# Patient Record
(demographics unavailable — no encounter records)

---

## 2018-06-26 NOTE — EMERGENCY DEPARTMENT REPORT
ED Abdominal Pain HPI





- General


Chief Complaint: Abdominal Pain


Stated Complaint: COMPLICATIONS POST SURGERY


Time Seen by Provider: 18 18:46


Source: patient


Mode of arrival: Ambulatory


Limitations: No Limitations





- History of Present Illness


Initial Comments: 





45 year-old who presents with abdominal pain. Seen in surgery clinic today and 

sent for CT. had ventral hernia repair early april complicated by what soudns 

like seroma formation. Now with midline swelling again. Sent from clinic for 

CT. endorses NV. No true fever, Tmax 100.1. 


MD Complaint: abdominal pain


-: Gradual


Location: periumbilical


Radiation: none


Migration to: no migration


Severity: severe


Quality: stabbing, sharp


Consistency: constant


Improves With: nothing


Worsens With: nothing


Context: recent surgery/procedure


Associated Symptoms: nausea, vomiting





- Related Data


LMP (females 10-50): this week


 Home Medications











 Medication  Instructions  Recorded  Confirmed  Last Taken


 


No Known Home Medications [No  18 Unknown





Reported Home Medications]    











 Allergies











Allergy/AdvReac Type Severity Reaction Status Date / Time


 


methylprednisolone Allergy  Hives Verified 18 11:45





[From Solu-Medrol]     


 


moxifloxacin [From Avelox] Allergy  Hives Verified 18 11:45














ED Review of Systems


ROS: 


Stated complaint: COMPLICATIONS POST SURGERY


Other details as noted in HPI





Comment: All other systems reviewed and negative





ED Past Medical Hx





- Past Medical History


Previous Medical History?: Yes


Hx COPD: Yes





- Surgical History


Past Surgical History?: Yes


Hx Cholecystectomy: Yes


Additional Surgical History: hernia repair .  





- Social History


Smoking Status: Never Smoker


Substance Use Type: Alcohol





- Medications


Home Medications: 


 Home Medications











 Medication  Instructions  Recorded  Confirmed  Last Taken  Type


 


No Known Home Medications [No  18 Unknown History





Reported Home Medications]     














ED Physical Exam





- General


Limitations: No Limitations


General appearance: alert, in no apparent distress





- Head


Head exam: Present: atraumatic, normocephalic





- Eye


Eye exam: Present: normal appearance





- ENT


ENT exam: Present: normal exam, mucous membranes moist





- Neck


Neck exam: Present: normal inspection





- Respiratory


Respiratory exam: Present: normal lung sounds bilaterally.  Absent: respiratory 

distress





- Cardiovascular


Cardiovascular Exam: Present: regular rate, normal rhythm.  Absent: systolic 

murmur, diastolic murmur, rubs, gallop





- GI/Abdominal


GI/Abdominal exam: Present: soft, other (Midline lower abdominal incision with 

palpable tender masses, 2-3 cm. multiple).  Absent: distended, guarding, rebound





- Extremities Exam


Extremities exam: Present: normal inspection





- Back Exam


Back exam: Present: normal inspection





- Neurological Exam


Neurological exam: Present: alert, oriented X3





- Psychiatric


Psychiatric exam: Present: normal affect, normal mood





- Skin


Skin exam: Present: warm, dry, intact, normal color.  Absent: rash





ED Course


 Vital Signs











  18





  11:45 20:12


 


Temperature 99.2 F 99.2 F


 


Pulse Rate 96 H 90


 


Respiratory 20 18





Rate  


 


Blood Pressure 146/77 


 


Blood Pressure  135/60





[Left]  


 


O2 Sat by Pulse 98 98





Oximetry  














ED Medical Decision Making





- Lab Data


Result diagrams: 


 18 12:02





 18 12:02








 Lab Results











  18 Range/Units





  12:02 12:02 12:02 


 


WBC  6.5    (4.5-11.0)  K/mm3


 


RBC  5.12 H    (3.65-5.03)  M/mm3


 


Hgb  10.6    (10.1-14.3)  gm/dl


 


Hct  34.6    (30.3-42.9)  %


 


MCV  68 L    (79-97)  fl


 


MCH  21 L    (28-32)  pg


 


MCHC  31    (30-34)  %


 


RDW  17.1 H    (13.2-15.2)  %


 


Plt Count  164    (140-440)  K/mm3


 


Lymph % (Auto)  21.2    (13.4-35.0)  %


 


Mono % (Auto)  6.5    (0.0-7.3)  %


 


Eos % (Auto)  15.1 H    (0.0-4.3)  %


 


Baso % (Auto)  0.6    (0.0-1.8)  %


 


Lymph #  1.4    (1.2-5.4)  K/mm3


 


Mono #  0.4    (0.0-0.8)  K/mm3


 


Eos #  1.0 H    (0.0-0.4)  K/mm3


 


Baso #  0.0    (0.0-0.1)  K/mm3


 


Seg Neutrophils %  56.6    (40.0-70.0)  %


 


Seg Neutrophils #  3.7    (1.8-7.7)  K/mm3


 


PT   12.9   (12.2-14.9)  Sec.


 


INR   0.93   (0.87-1.13)  


 


VBG pH     (7.320-7.420)  


 


Sodium    137  (137-145)  mmol/L


 


Potassium    4.1  (3.6-5.0)  mmol/L


 


Chloride    96.7 L  ()  mmol/L


 


Carbon Dioxide    27  (22-30)  mmol/L


 


Anion Gap    17  mmol/L


 


BUN    10  (7-17)  mg/dL


 


Creatinine    0.7  (0.7-1.2)  mg/dL


 


Estimated GFR    > 60  ml/min


 


BUN/Creatinine Ratio    14  %


 


Glucose    139 H  ()  mg/dL


 


Lactic Acid     (0.7-2.0)  mmol/L


 


Calcium    9.0  (8.4-10.2)  mg/dL


 


Total Bilirubin    0.60  (0.1-1.2)  mg/dL


 


AST    21  (5-40)  units/L


 


ALT    23  (7-56)  units/L


 


Alkaline Phosphatase    49  ()  units/L


 


Total Protein    7.2  (6.3-8.2)  g/dL


 


Albumin    3.6 L  (3.9-5)  g/dL


 


Albumin/Globulin Ratio    1.0  %


 


HCG, Qual     (Negative)  


 


Urine Color     (Yellow)  


 


Urine Turbidity     (Clear)  


 


Urine pH     (5.0-7.0)  


 


Ur Specific Gravity     (1.003-1.030)  


 


Urine Protein     (Negative)  mg/dL


 


Urine Glucose (UA)     (Negative)  mg/dL


 


Urine Ketones     (Negative)  mg/dL


 


Urine Blood     (Negative)  


 


Urine Nitrite     (Negative)  


 


Ur Reducing Substances     


 


Urine Bilirubin     (Negative)  


 


Urine Ictotest     


 


Urine Urobilinogen     (<2.0)  mg/dL


 


Ur Leukocyte Esterase     (Negative)  


 


Urine WBC (Auto)     (0.0-6.0)  /HPF


 


Urine RBC (Auto)     (0.0-6.0)  /HPF


 


U Epithel Cells (Auto)     (0-13.0)  /HPF


 


Urine Mucus     /HPF


 


Urine Yeast (Budding)     /HPF


 


Urine Sperm     (NP)  /HPF














  18 Range/Units





  12:02 12:02 19:22 


 


WBC     (4.5-11.0)  K/mm3


 


RBC     (3.65-5.03)  M/mm3


 


Hgb     (10.1-14.3)  gm/dl


 


Hct     (30.3-42.9)  %


 


MCV     (79-97)  fl


 


MCH     (28-32)  pg


 


MCHC     (30-34)  %


 


RDW     (13.2-15.2)  %


 


Plt Count     (140-440)  K/mm3


 


Lymph % (Auto)     (13.4-35.0)  %


 


Mono % (Auto)     (0.0-7.3)  %


 


Eos % (Auto)     (0.0-4.3)  %


 


Baso % (Auto)     (0.0-1.8)  %


 


Lymph #     (1.2-5.4)  K/mm3


 


Mono #     (0.0-0.8)  K/mm3


 


Eos #     (0.0-0.4)  K/mm3


 


Baso #     (0.0-0.1)  K/mm3


 


Seg Neutrophils %     (40.0-70.0)  %


 


Seg Neutrophils #     (1.8-7.7)  K/mm3


 


PT     (12.2-14.9)  Sec.


 


INR     (0.87-1.13)  


 


VBG pH   7.351   (7.320-7.420)  


 


Sodium     (137-145)  mmol/L


 


Potassium     (3.6-5.0)  mmol/L


 


Chloride     ()  mmol/L


 


Carbon Dioxide     (22-30)  mmol/L


 


Anion Gap     mmol/L


 


BUN     (7-17)  mg/dL


 


Creatinine     (0.7-1.2)  mg/dL


 


Estimated GFR     ml/min


 


BUN/Creatinine Ratio     %


 


Glucose     ()  mg/dL


 


Lactic Acid  1.20    (0.7-2.0)  mmol/L


 


Calcium     (8.4-10.2)  mg/dL


 


Total Bilirubin     (0.1-1.2)  mg/dL


 


AST     (5-40)  units/L


 


ALT     (7-56)  units/L


 


Alkaline Phosphatase     ()  units/L


 


Total Protein     (6.3-8.2)  g/dL


 


Albumin     (3.9-5)  g/dL


 


Albumin/Globulin Ratio     %


 


HCG, Qual    Negative  (Negative)  


 


Urine Color     (Yellow)  


 


Urine Turbidity     (Clear)  


 


Urine pH     (5.0-7.0)  


 


Ur Specific Gravity     (1.003-1.030)  


 


Urine Protein     (Negative)  mg/dL


 


Urine Glucose (UA)     (Negative)  mg/dL


 


Urine Ketones     (Negative)  mg/dL


 


Urine Blood     (Negative)  


 


Urine Nitrite     (Negative)  


 


Ur Reducing Substances     


 


Urine Bilirubin     (Negative)  


 


Urine Ictotest     


 


Urine Urobilinogen     (<2.0)  mg/dL


 


Ur Leukocyte Esterase     (Negative)  


 


Urine WBC (Auto)     (0.0-6.0)  /HPF


 


Urine RBC (Auto)     (0.0-6.0)  /HPF


 


U Epithel Cells (Auto)     (0-13.0)  /HPF


 


Urine Mucus     /HPF


 


Urine Yeast (Budding)     /HPF


 


Urine Sperm     (NP)  /HPF














  18 Range/Units





  19:36 


 


WBC   (4.5-11.0)  K/mm3


 


RBC   (3.65-5.03)  M/mm3


 


Hgb   (10.1-14.3)  gm/dl


 


Hct   (30.3-42.9)  %


 


MCV   (79-97)  fl


 


MCH   (28-32)  pg


 


MCHC   (30-34)  %


 


RDW   (13.2-15.2)  %


 


Plt Count   (140-440)  K/mm3


 


Lymph % (Auto)   (13.4-35.0)  %


 


Mono % (Auto)   (0.0-7.3)  %


 


Eos % (Auto)   (0.0-4.3)  %


 


Baso % (Auto)   (0.0-1.8)  %


 


Lymph #   (1.2-5.4)  K/mm3


 


Mono #   (0.0-0.8)  K/mm3


 


Eos #   (0.0-0.4)  K/mm3


 


Baso #   (0.0-0.1)  K/mm3


 


Seg Neutrophils %   (40.0-70.0)  %


 


Seg Neutrophils #   (1.8-7.7)  K/mm3


 


PT   (12.2-14.9)  Sec.


 


INR   (0.87-1.13)  


 


VBG pH   (7.320-7.420)  


 


Sodium   (137-145)  mmol/L


 


Potassium   (3.6-5.0)  mmol/L


 


Chloride   ()  mmol/L


 


Carbon Dioxide   (22-30)  mmol/L


 


Anion Gap   mmol/L


 


BUN   (7-17)  mg/dL


 


Creatinine   (0.7-1.2)  mg/dL


 


Estimated GFR   ml/min


 


BUN/Creatinine Ratio   %


 


Glucose   ()  mg/dL


 


Lactic Acid   (0.7-2.0)  mmol/L


 


Calcium   (8.4-10.2)  mg/dL


 


Total Bilirubin   (0.1-1.2)  mg/dL


 


AST   (5-40)  units/L


 


ALT   (7-56)  units/L


 


Alkaline Phosphatase   ()  units/L


 


Total Protein   (6.3-8.2)  g/dL


 


Albumin   (3.9-5)  g/dL


 


Albumin/Globulin Ratio   %


 


HCG, Qual   (Negative)  


 


Urine Color  Yellow  (Yellow)  


 


Urine Turbidity  Slightly cloudy  (Clear)  


 


Urine pH  6.0  (5.0-7.0)  


 


Ur Specific Gravity  1.024  (1.003-1.030)  


 


Urine Protein  100 mg/dl  (Negative)  mg/dL


 


Urine Glucose (UA)  Neg  (Negative)  mg/dL


 


Urine Ketones  Neg  (Negative)  mg/dL


 


Urine Blood  Lg  (Negative)  


 


Urine Nitrite  Neg  (Negative)  


 


Ur Reducing Substances  Not Reportable  


 


Urine Bilirubin  Neg  (Negative)  


 


Urine Ictotest  Not Reportable  


 


Urine Urobilinogen  2.0  (<2.0)  mg/dL


 


Ur Leukocyte Esterase  Sm  (Negative)  


 


Urine WBC (Auto)  > 182.0 H  (0.0-6.0)  /HPF


 


Urine RBC (Auto)  > 182.0  (0.0-6.0)  /HPF


 


U Epithel Cells (Auto)  11.0  (0-13.0)  /HPF


 


Urine Mucus  3+  /HPF


 


Urine Yeast (Budding)  3+  /HPF


 


Urine Sperm  1+  (NP)  /HPF














- Radiology Data


Radiology results: report reviewed





- Medical Decision Making





Ms Carlos is a 45 year-old woman with hx of COPD and ventral hernia repair in 

April who presents from general surgery office for evaluation and CT scan. Had 

seroma drained previously. Has tender swelling in her incision site. VSS. 

Afebrile here. Labs wnl. Exam with non-reducible masses within her incision. 

Suspect this is seroma vs hernia vs abscess. no evidence of infection on exam 

or labs. CT reveals small fat containing hernias. Unable to reduce. Spoke with 

Dr. Galvez who would like admission to hospitalist. Would like IR consult for 

drainage. Dr Galvez will see patient in the morning. Per Dr Galvez, patient is 

NOT to receive narcotics. Received one dose fentanyl on arrival. Given toradol 

as well. Admit to hospital medicine. 


Critical care attestation.: 


If time is entered above; I have spent that time in minutes in the direct care 

of this critically ill patient, excluding procedure time.








ED Disposition


Clinical Impression: 


 Ventral hernia, recurrent





Disposition: DC-09 OP ADMIT IP TO THIS HOSP


Is pt being admited?: Yes


Does the pt Need Aspirin: No


Condition: Stable


Instructions:  Abdominal Pain (ED)


Referrals: 


PRIMARY CARE,MD [Primary Care Provider] - 3-5 Days

## 2018-06-26 NOTE — CAT SCAN REPORT
FINAL REPORT



PROCEDURE:  CT ABDOMEN PELVIS WO CON



TECHNIQUE:  Computerized axial tomography of the abdomen and

pelvis was performed without intravenous contrast. This study is

performed without intravascular contrast material and its

sensitivity for abdominal and pelvic pathology, including

neoplasms, inflammation, abscess, free fluid, thrombosis,

arterial dissection and infarction, is reduced compared with a

contrast enhanced study. 



HISTORY:  abdominal pain 



COMPARISON:  No prior studies are available for comparison.



FINDINGS:  

Lower Lung fields: No focal abnormality seen.



Upper Abdomen: Large hiatal hernia is visualized. Gallbladder is

surgically absent. Unenhanced images of the liver are

unremarkable. The adrenal glands, pancreas and spleen are

unremarkable.



Kidneys, Ureters and Urinary bladder: There is a coarse

benign-appearing calcification in the middle 3rd of the right

kidney measuring 1.5 x 0.7 centimeters. Second smaller

calcification also visualized measuring approximately 4

millimeters in size. No renal masses are identified. There is no

hydronephrosis. No ureteral calculi are visualized. The ureters

are not distended. Urinary bladder is nearly empty and difficult

to characterize.



Retroperitoneum: Atherosclerotic changes are seen in the

abdominal aorta and iliac arteries. No aneurysm is visualized.



Nonspecific subcentimeter lymph nodes are seen in the

retroperitoneum. No pathologically enlarged lymph nodes are

identified. 



Bowel: The rectum is moderately distended with stool otherwise is

unremarkable. Minimal diverticulosis visualized in the splenic

flexure and mid transverse colon. The appendix is not visualized.

No evidence of bowel obstruction. There is no ascites or free

intraperitoneal gas.



There is a anterior pelvic wall hernia just to the left of

midline. This contains a moderate amount of fluid. There is also

adipose tissue present. This also shows irregular soft tissue

density anteriorly seen on image 139 series 2. This measures up

to 1.4 centimeters in thickness. This could represent a seroma or

hematoma within a hernia. Wall thickening anteriorly may

represent inflammatory response. I cannot exclude a mass in the

anterior wall of the hernia. 



Reproductive organs: Uterus and adnexa are unremarkable.



Other: No acute bony abnormalities are visualized.



IMPRESSION:  

Abnormal fluid collections seen adipose tissue anterior to the

lower pelvis. I suspect this represents a hernia containing

moderate amount of fluid and adipose tissue. As indicated above

there appears to be soft tissue thickening of the anterior wall

the hernia sac. This may represent an inflammatory response. I

cannot exclude a mass.



There is no evidence of bowel obstruction. No ascites is seen in

the peritoneal cavity.



Prior cholecystectomy.



Benign-appearing moderate-sized calculus right kidney.



The rectum is moderately distended with stool otherwise is

unremarkable.



Minimal colonic diverticulosis as described.



Large hiatal hernia is visualized..

## 2018-06-26 NOTE — XRAY REPORT
AP CHEST:



HISTORY: Sepsis



AP view of the chest demonstrates a normal mediastinal and

cardiac contour with clear lungs and normal bony and soft tissue

structures.



IMPRESSION:

Unremarkable AP chest.

## 2018-06-27 NOTE — CONSULTATION
History of Present Illness


Consult date: 18


Chief complaint: 





abd pain





- History of present illness


History of present illness: 





46 yo F with hx of ventral hernia and multiple abdominal operations now s/p 

robotic assisted laparoscopic lysis of adhesions, ventral hernia repair with 

mesh in 2018 by Dr. Sexton. The patient has been back and forth to the 

[facility] ER near her home with abdominal pain after the surgery. She was 

readmitted to [facility] one month ago due to abdominal pain, subcutaneous fluid 

collections which were drained and found to be post op seromas. She felt better 

after drainage and was discharged. Since then, she states the pain has 

restarted and she again went to [facility] multiple times for pain medication. 

Despite telling the patient multiple times to follow up in my office, she did 

not until yesterday. The patient was sent to ER for fever w/u as temp in office 

was 100.1. It was also felt that she likely had re accumulated seroma which was 

leading to her pain. She was seen by IR and a CT guided drainage catheter was 

placed. The patient states she feels much better after the drainage procedure 

and her pain is minimal. She is tolerating a diet. She has been afebrile. 





Past History


Past Medical History: other (asthma, chronic pain, ventral hernia, anxiety, 

obesity, hx of suicidal ideations)


Past Surgical History: , hysterectomy, hernia repair, Other (ventral 

hernia repair with mesh multiple)


Social history: no significant social history, alcohol abuse (social).  denies: 

smoking


Family history: no significant family history





Medications and Allergies


 Allergies











Allergy/AdvReac Type Severity Reaction Status Date / Time


 


methylprednisolone Allergy  Hives Verified 18 11:45





[From Solu-Medrol]     


 


moxifloxacin [From Avelox] Allergy  Hives Verified 18 11:45











 Home Medications











 Medication  Instructions  Recorded  Confirmed  Last Taken  Type


 


No Known Home Medications [No  18 Unknown History





Reported Home Medications]     











Active Meds: 


Active Medications





Acetaminophen (Tylenol)  650 mg PO Q4H PRN


   PRN Reason: Fever >101


   Last Admin: 18 07:54 Dose:  650 mg


Acetaminophen/Hydrocodone Bitart (Norco 5/325)  1 each PO Q6H PRN


   PRN Reason: Pain, Moderate (4-6)


   Last Admin: 18 17:25 Dose:  1 each


Heparin Sodium (Porcine) (Heparin)  5,000 unit SUB-Q Q12HR [facility]


   Last Admin: 18 11:22 Dose:  Not Given


Ceftriaxone Sodium 1 gm/ (Sodium Chloride)  20 mls @ 2 mls/min IV Q24HR [facility]


   Last Admin: 18 12:05 Dose:  2 mls/min


Ondansetron HCl (Zofran)  4 mg IV Q8H PRN


   PRN Reason: Nausea And Vomiting











Review of Systems


All systems: negative (10 point ROS performed and negative except for that 

listed in HPI)





Exam


 Vital Signs











Temp Pulse Resp BP Pulse Ox


 


 99.2 F   96 H  20   146/77   98 


 


 18 11:45  18 11:45  18 11:45  18 11:45  18 11:45











Narrative exam: 





Gen: AAOx3. NAD


CV: S1, S2+


resp: even and unlabored


Abd: soft, NT, ND. KARINA drain with serosang drainage. Dressing c/d/i


Ext: no c/c/e





Results





- Labs





 18 12:02





 18 12:02


 Abnormal lab results











  18 Range/Units





  19:36 


 


Urine WBC (Auto)  > 182.0 H  (0.0-6.0)  /HPF














- Imaging


CT scan - abdomen: report reviewed, image reviewed


CT scan - pelvis: report reviewed, image reviewed





Assessment and Plan





46 yo F with post operative recurrent seroma s/p robotic assisted laparoscopic 

lysis of adhesions, ventral hernia repair with mesh (2018)





s/p CT guided drain placement by IR





Plan:





1. Reg diet


2. PRN PO pain control, no IV pain meds please


3. KARINA drain management


4. cultures pending but likely sterile collection


5. OOB/ambulate


6. OK to dc home from surgery standpoint with follow up in surgery office in 1 

week. 





D/W Dr. Chiu

## 2018-06-27 NOTE — CAT SCAN REPORT
Exam: Exam: CT-guided placement of drainage catheter in abdominal wall 

seroma



Clinical indication: Patient with abdominal wall seroma



D.: 06/27/death and 18



Procedure: Following an explanation of the risks, benefits and 

alternatives; written informed consent was obtained. The patient was 

brought to the CT scanner and placed in supine position on the gantry. 

Initial  images the abdomen were performed an appropriate access 

site was chosen in the left lower quadrant. Patient's left lower 

quadrant was prepped and draped in the usual sterile fashion. 1% 

lidocaine was used for anesthesia.



Using intermittent CT guidance, an 18-gauge 10 cm trocar needle was 

advanced into the abdominal wall seroma. There was prompt return of 

predominantly serous fluid with a serosanguineous component. No 

purulence is identified. A 0.035 guidewire was then advanced through 

the needle and coiled within the multiloculated seromatous. The needle 

was removed. Following serial dilation over the guidewire, an 8 Samoan 

pigtail catheter was advanced over the guidewire and positioned within 

the medial central aspect of the fluid collections. Approximately 10 

mL's of serous fluid was aspirated. Samples were sent for laboratory 

analysis.



The catheter was securely fastened to the skin surface using 2-0 

Ethilon suture and a sterile dressing applied. The catheter was then 

placed to KARINA bulb drainage.



The patient tolerated the procedure well. There were no immediate post 

procedure complications.



Conscious sedation was performed under the guidance of radiologic 

nursing. Continuous cardiopulmonary monitoring was utilized.



Impression: 1) CT-guided placement of 8 Samoan drainage catheter in 

abdominal wall seroma with prompt return of serous fluid. A component 

of fluid was sero-sanguinous. No purulence is identified. Sample sent 

for laboratory analysis.

## 2018-06-27 NOTE — HISTORY AND PHYSICAL REPORT
CHIEF COMPLAINT:  Abdominal discomfort.



HISTORY OF PRESENT ILLNESS:  The patient is a 45-year-old female presenting with

abdominal pain.  The patient went for followup with the surgeon, in the train

ate paste.  She had surgery involving ventral hernia repair about a month ago

and has been having some complications with swelling and fluid collection.  She

was asked by the surgeon to come to the office.  At the surgeon's office, the

patient was evaluated and then sent to the Emergency Room.  There is no history

of fever, no history of chills.  There is history of nausea and vomiting, but no

history of constipation or diarrhea.  The patient denied history of chest pain

or shortness of breath and only complained about abdominal pain with swelling

around the surgical area.  



PAST MEDICAL HISTORY:  Pertinent for COPD.



PAST SURGICAL HISTORY:  Pertinent for recent hernia repair as well as the

cholecystectomy and .



FAMILY HISTORY:  Noncontributory.



SOCIAL HISTORY:  The patient does not smoke, does not drink alcohol, and does

not use illicit drugs.



MEDICATIONS:  The patient's home medications are not known.



ALLERGIES:  The patient is allergic to METHYLPREDNISOLONE AND MOXIFLOXACIN.



REVIEW OF SYSTEMS.

CONSTITUTIONAL:  There is no fever, no chills, no diaphoresis.

HEENT:  There is no headache or sore throat.

CARDIOVASCULAR:  There is no chest pain or orthopnea.

RESPIRATORY:  There is no shortness of breath or cough.

GASTROINTESTINAL:  Swelling in the anterior abdominal area noted.

MUSCULOSKELETAL:  There is no joint swelling or tenderness.

DERMATOLOGICAL:  There is no skin rash .

GENITOURINARY:  Show no costovertebral angle tenderness.



PERTINENT LABORATORY AND IMAGING STUDIES:  The patient has CBC done that shows

normal white cells, normal hemoglobin and normal hematocrit with unremarkable

CBC differential.  The patient's chemistry was unremarkable.  Urinalysis show

positive urine leukocyte esterase with elevated urine WBC of greater than 182

and elevated urine RBC of greater than 182.



IMAGING STUDIES:  The patient had an abdominal and pelvic CT with contrast done

and this shows hernia with fluid and adipose tissue collection in the anterior

aspect of the abdomen.



DIAGNOSES:

1. Ventral hernia at the surgical site.

2. Urinary tract infection.



PLAN:  The patient will be admitted to medical floor and will continue the

Surgical Consult with Dr. Sexton who was part of the surgeons that handles the

patient's surgical care.  The patient will also be on IV ceftriaxone 1 gram

daily for treatment of UTI and DVT prophylaxis will be with heparin 5000 units

subq q. 12 and the patient will be on IV Zofran 4 mg every 8 hours as needed for

nausea and vomiting.  



Further management of the patient's condition will be determined by the surgeon.

 The patient will remain n.p.o. until seen by the surgeon.





DD: 2018 06:33

DT: 2018 08:25

JOB# [id number]  [id number]

OCN/NTS

## 2018-06-27 NOTE — PROGRESS NOTE
Assessment and Plan


Ventral hernia repair presenting with abdominal wall seroma


- s/p Ct guided drainage placed, cont supportive care


- pt will do outpt follow up





SIRS


- probably due to abdominal wall seroma


- will follow urine cx and wound cx


- cont abx for now





Obesity: dietary recommendation





DVT Px, lovenox








Brief History: 


44 yo F with hx of ventral hernia and multiple abdominal operations now s/p 

robotic assisted laparoscopic lysis of adhesions, ventral hernia repair with 

mesh in April 2018 by Dr. Sexton was sent to ER for fever w/u as temp in office 

was 100.1. It was also felt that she likely had re accumulated seroma which was 

leading to her pain. She was seen by IR and a CT guided drainage catheter was 

placed.  





Radiological data: 


CT drain cyst/abscess:


CT-guided placement of 8 Trinidadian drainage catheter in abdominal wall seroma with 

prompt return of serous fluid. A component of fluid was sero-sanguinous. No 

purulence is identified. Sample sent for laboratory analysis. 





CT abdomen/pelvis


Abnormal fluid collections seen adipose tissue anterior to the lower pelvis. I 

suspect this represents a hernia containing moderate amount of fluid and 

adipose tissue. As indicated above there appears to be soft tissue thickening 

of the anterior wall the hernia sac. This may represent an inflammatory 

response. I cannot exclude a mass. There is no evidence of bowel obstruction. 

No ascites is seen in the peritoneal cavity. Prior cholecystectomy. Benign-

appearing moderate-sized calculus right kidney. The rectum is moderately 

distended with stool otherwise is unremarkable. Minimal colonic diverticulosis 

as described. Large hiatal hernia is visualized.. 





CXR: Unremarkable AP chest. 





Hospitalist Physical exam:


GENERAL:  well-developed and well-nourished   lying on bed appeared to be in no 

discomfort. 


HEENT: Normocephalic.  Atraumatic.  No conjunctival congestion or icterus. 

Patient has moist mucous membranes.


NECK: Supple.  Trachea midline.


CHEST/LUNGS: Clear to auscultated bilaterally, breathing nonlabored. No wheezes 

crackles or rhonchi.


HEART/CARDIOVASCULAR: Regular in rate and rhythm.  S1 and S2 positive.


ABDOMEN: Abdomen is soft, nontender.  Patient has normal bowel sounds.  


SKIN: There is no rash.  Warm and dry.


NEURO:  No focal motor deficit.  Follows command.


MUSCULOSKELETAL: No joint effusion or tenderness.


EXTRIMITY: No edema, no cyanosis or clubbing.


PSYCH:  Cooperative.








Subjective


Date of service: 06/27/18


Interval history: 





Patient seen and examined.  Medical records and medication list reviewed.  


No acute event overnight noted by the RN.  


Patient denies any chest pain or difficulty breathing.  Patient is tolerating 

diet.  


Discussed plan of care at bedside with patient.








Objective





- Constitutional


Vitals: 


 Vital Signs - 12hr











  06/27/18 06/27/18 06/27/18





  07:54 08:03 10:48


 


Temperature  98.7 F 


 


Temperature [   





Post-Procedure]   


 


Temperature [   98.5 F





Pre-Procedure]   


 


Pulse Rate  75 


 


Pulse Rate [   





Intra-Procedure   





]   


 


Pulse Rate [   





Post-Procedure]   


 


Pulse Rate [Pre   77





-Procedure]   


 


Respiratory 20 20 





Rate   


 


Respiratory   





Rate [Intra-   





Procedure]   


 


Respiratory   





Rate [Post-   





Procedure]   


 


Respiratory   19





Rate [Pre-   





Procedure]   


 


Blood Pressure  102/57 


 


Blood Pressure   





[Intra-   





Procedure]   


 


Blood Pressure   





[Post-Procedure   





]   


 


Blood Pressure   108/66





[Pre-Procedure]   


 


O2 Sat by Pulse  96 





Oximetry   


 


O2 Sat by Pulse   





Oximetry [   





Intra-Procedure   





]   


 


O2 Sat by Pulse   





Oximetry [Post   





-Procedure]   


 


O2 Sat by Pulse   100





Oximetry [Pre-   





Procedure]   














  06/27/18 06/27/18 06/27/18





  10:49 10:50 10:55


 


Temperature   


 


Temperature [   





Post-Procedure]   


 


Temperature [   





Pre-Procedure]   


 


Pulse Rate   


 


Pulse Rate [  80 76





Intra-Procedure   





]   


 


Pulse Rate [   





Post-Procedure]   


 


Pulse Rate [Pre   





-Procedure]   


 


Respiratory 18  





Rate   


 


Respiratory  18 18





Rate [Intra-   





Procedure]   


 


Respiratory   





Rate [Post-   





Procedure]   


 


Respiratory   





Rate [Pre-   





Procedure]   


 


Blood Pressure   


 


Blood Pressure  117/69 111/64





[Intra-   





Procedure]   


 


Blood Pressure   





[Post-Procedure   





]   


 


Blood Pressure   





[Pre-Procedure]   


 


O2 Sat by Pulse   





Oximetry   


 


O2 Sat by Pulse  99 98





Oximetry [   





Intra-Procedure   





]   


 


O2 Sat by Pulse   





Oximetry [Post   





-Procedure]   


 


O2 Sat by Pulse   





Oximetry [Pre-   





Procedure]   














  06/27/18 06/27/18 06/27/18





  11:00 11:05 11:10


 


Temperature   


 


Temperature [   





Post-Procedure]   


 


Temperature [   





Pre-Procedure]   


 


Pulse Rate   


 


Pulse Rate [ 77 77 80





Intra-Procedure   





]   


 


Pulse Rate [   





Post-Procedure]   


 


Pulse Rate [Pre   





-Procedure]   


 


Respiratory   





Rate   


 


Respiratory 16 16 18





Rate [Intra-   





Procedure]   


 


Respiratory   





Rate [Post-   





Procedure]   


 


Respiratory   





Rate [Pre-   





Procedure]   


 


Blood Pressure   


 


Blood Pressure 110/64 110/64 115/60





[Intra-   





Procedure]   


 


Blood Pressure   





[Post-Procedure   





]   


 


Blood Pressure   





[Pre-Procedure]   


 


O2 Sat by Pulse   





Oximetry   


 


O2 Sat by Pulse 99 99 98





Oximetry [   





Intra-Procedure   





]   


 


O2 Sat by Pulse   





Oximetry [Post   





-Procedure]   


 


O2 Sat by Pulse   





Oximetry [Pre-   





Procedure]   














  06/27/18 06/27/18 06/27/18





  11:15 11:19 11:20


 


Temperature   


 


Temperature [   98.1 F





Post-Procedure]   


 


Temperature [   





Pre-Procedure]   


 


Pulse Rate   


 


Pulse Rate [ 83  





Intra-Procedure   





]   


 


Pulse Rate [   79





Post-Procedure]   


 


Pulse Rate [Pre   





-Procedure]   


 


Respiratory  18 





Rate   


 


Respiratory 14  





Rate [Intra-   





Procedure]   


 


Respiratory   14





Rate [Post-   





Procedure]   


 


Respiratory   





Rate [Pre-   





Procedure]   


 


Blood Pressure   


 


Blood Pressure 121/63  





[Intra-   





Procedure]   


 


Blood Pressure   113/69





[Post-Procedure   





]   


 


Blood Pressure   





[Pre-Procedure]   


 


O2 Sat by Pulse   





Oximetry   


 


O2 Sat by Pulse 99  





Oximetry [   





Intra-Procedure   





]   


 


O2 Sat by Pulse   99





Oximetry [Post   





-Procedure]   


 


O2 Sat by Pulse   





Oximetry [Pre-   





Procedure]   














  06/27/18





  11:30


 


Temperature 


 


Temperature [ 





Post-Procedure] 


 


Temperature [ 





Pre-Procedure] 


 


Pulse Rate 


 


Pulse Rate [ 





Intra-Procedure 





] 


 


Pulse Rate [ 83





Post-Procedure] 


 


Pulse Rate [Pre 





-Procedure] 


 


Respiratory 





Rate 


 


Respiratory 





Rate [Intra- 





Procedure] 


 


Respiratory 14





Rate [Post- 





Procedure] 


 


Respiratory 





Rate [Pre- 





Procedure] 


 


Blood Pressure 


 


Blood Pressure 





[Intra- 





Procedure] 


 


Blood Pressure 113/63





[Post-Procedure 





] 


 


Blood Pressure 





[Pre-Procedure] 


 


O2 Sat by Pulse 





Oximetry 


 


O2 Sat by Pulse 





Oximetry [ 





Intra-Procedure 





] 


 


O2 Sat by Pulse 98





Oximetry [Post 





-Procedure] 


 


O2 Sat by Pulse 





Oximetry [Pre- 





Procedure] 














- Labs


CBC & Chem 7: 


 06/26/18 12:02





 06/26/18 12:02


Labs: 


 Abnormal lab results











  06/26/18 Range/Units





  19:36 


 


Urine WBC (Auto)  > 182.0 H  (0.0-6.0)  /HPF

## 2018-06-27 NOTE — CONSULTATION
History of Present Illness





- Reason for Consult


Consult date: 06/27/18


abdominal wall seroma





- History of Present Illness





With a history of ventral hernia repair presenting with abdominal wall seroma.  

She complains of abdominal pain.





Past History


Past Surgical History: Other (ventral hernia repair)


Social history: no significant social history


Family history: no significant family history





Medications and Allergies


 Allergies











Allergy/AdvReac Type Severity Reaction Status Date / Time


 


methylprednisolone Allergy  Hives Verified 06/26/18 11:45





[From Solu-Medrol]     


 


moxifloxacin [From Avelox] Allergy  Hives Verified 06/26/18 11:45











 Home Medications











 Medication  Instructions  Recorded  Confirmed  Last Taken  Type


 


No Known Home Medications [No  06/26/18 06/26/18 Unknown History





Reported Home Medications]     











Active Meds: 


Active Medications





Acetaminophen (Tylenol)  650 mg PO Q4H PRN


   PRN Reason: Fever >101


   Last Admin: 06/27/18 07:54 Dose:  650 mg


Heparin Sodium (Porcine) (Heparin)  5,000 unit SUB-Q Q12HR NIKA


   Last Admin: 06/27/18 11:22 Dose:  Not Given


Ceftriaxone Sodium 1 gm/ (Sodium Chloride)  20 mls @ 2 mls/min IV Q24HR NIKA


Ondansetron HCl (Zofran)  4 mg IV Q8H PRN


   PRN Reason: Nausea And Vomiting











Review of Systems


All systems: negative





Exam





- Constitutional


Vitals: 


 











Temp Pulse Resp BP Pulse Ox


 


 98.1 F   83   14   113/63   98 


 


 06/27/18 11:20  06/27/18 11:30  06/27/18 11:30  06/27/18 11:30  06/27/18 11:30











General appearance: Present: no acute distress, obese





- EENT


Eyes: Present: PERRL, EOM intact


ENT: hearing intact





- Neck


Neck: Present: supple, normal ROM





- Respiratory


Respiratory effort: normal





- Cardiovascular


Rhythm: regular





- Extremities


Extremities: no ischemia





- Abdominal


General gastrointestinal: Present: tender (abdominal wall)





- Rectal


Rectal Exam: deferred





- Integumentary


Integumentary: Present: clear





- Psychiatric


Psychiatric: appropriate mood/affect, cooperative





Results





- Labs


CBC & Chem 7: 


 06/26/18 12:02





 06/26/18 12:02


Labs: 


 Abnormal lab results











  06/26/18 06/26/18 06/26/18 Range/Units





  12:02 12:02 19:36 


 


RBC  5.12 H    (3.65-5.03)  M/mm3


 


MCV  68 L    (79-97)  fl


 


MCH  21 L    (28-32)  pg


 


RDW  17.1 H    (13.2-15.2)  %


 


Eos % (Auto)  15.1 H    (0.0-4.3)  %


 


Eos #  1.0 H    (0.0-0.4)  K/mm3


 


Chloride   96.7 L   ()  mmol/L


 


Glucose   139 H   ()  mg/dL


 


Albumin   3.6 L   (3.9-5)  g/dL


 


Urine WBC (Auto)    > 182.0 H  (0.0-6.0)  /HPF














- Imaging and Cardiology


CT scan - abdomen: image reviewed





Assessment and Plan





Patient will be scheduled for placement of an 8 Syriac drain within the seroma 

to allow for drainage.  Once the drainage volume decreases below 10 the 15 mL's 

per day, it may be removed.

## 2018-06-28 NOTE — DISCHARGE SUMMARY
<GUANAKO ARORA - Last Filed: 06/28/18 15:09>





Providers





- Providers


Date of Admission: 


06/26/18 22:23





Date of discharge: 06/28/18


Attending physician: 


GUANAKO ARORA





 





06/26/18 22:25


Consult to Physician [CONS] Routine 


   Comment: 


   Consulting Provider: TOM PERDOMO


   Physician Instructions: 


   Reason For Exam: VENTRAL HERNIA





06/27/18 09:17


Consult to Interventional Radiology [CONS] Routine 


   Consulting Provider: KADEEM ARORA


   Reason For Exam: drain placement subcutaneous seroma


   Place consult to:: DR. PADRON


   Notified:: DR. PADRON


   Phone number called:: IN HOUSE


   Was contact made?: Yes


   If yes, spoke with:: MD


   Time called:: 08:27











Primary care physician: 


PRIMARY CARE MD








Hospitalization


Condition: Stable


Hospital course: 


Brief History: 


46 yo F with hx of ventral hernia and multiple abdominal operations now s/p 

robotic assisted laparoscopic lysis of adhesions, ventral hernia repair with 

mesh in April 2018 by Dr. Perdomo was sent to ER for fever w/u as temp in office 

was 100.1. It was also felt that she likely had re accumulated seroma which was 

leading to her pain. She was seen by IR and a CT guided drainage catheter was 

placed.  





Discharge diagnosis and management:


Ventral hernia repair presenting with abdominal wall seroma


- s/p Ct guided drainage placed, cont supportive care


- pt will do outpt follow up





SIRS


- probably due to abdominal wall seroma


- will follow urine cx and wound cx


- cont abx for now





Obesity: dietary recommendation





DVT Px, lovenox





Radiological data: 


CT drain cyst/abscess:


CT-guided placement of 8 Macedonian drainage catheter in abdominal wall seroma with 

prompt return of serous fluid. A component of fluid was sero-sanguinous. No 

purulence is identified. Sample sent for laboratory analysis. 





CT abdomen/pelvis


Abnormal fluid collections seen adipose tissue anterior to the lower pelvis. I 

suspect this represents a hernia containing moderate amount of fluid and 

adipose tissue. As indicated above there appears to be soft tissue thickening 

of the anterior wall the hernia sac. This may represent an inflammatory 

response. I cannot exclude a mass. There is no evidence of bowel obstruction. 

No ascites is seen in the peritoneal cavity. Prior cholecystectomy. Benign-

appearing moderate-sized calculus right kidney. The rectum is moderately 

distended with stool otherwise is unremarkable. Minimal colonic diverticulosis 

as described. Large hiatal hernia is visualized.. 





CXR: Unremarkable AP chest. 





Hospitalist Physical exam:


GENERAL:  well-developed and well-nourished   lying on bed appeared to be in no 

discomfort. 


HEENT: Normocephalic.  Atraumatic.  No conjunctival congestion or icterus. 

Patient has moist mucous membranes.


NECK: Supple.  Trachea midline.


CHEST/LUNGS: Clear to auscultated bilaterally, breathing nonlabored. No wheezes 

crackles or rhonchi.


HEART/CARDIOVASCULAR: Regular in rate and rhythm.  S1 and S2 positive.


ABDOMEN: Abdomen is soft, nontender.  Patient has normal bowel sounds.  


SKIN: There is no rash.  Warm and dry.


NEURO:  No focal motor deficit.  Follows command.


MUSCULOSKELETAL: No joint effusion or tenderness.


EXTRIMITY: No edema, no cyanosis or clubbing.


PSYCH:  Cooperative.








Disposition: DC-01 TO HOME OR SELFCARE


Time spent for discharge: 34 minutes





Core Measure Documentation





- Palliative Care


Palliative Care/ Comfort Measures: Not Applicable





- Core Measures


Any of the following diagnoses?: none





Exam





- Constitutional


Vitals: 


 











Temp Pulse Resp BP Pulse Ox


 


 98.5 F   83   20   113/60   98 


 


 06/28/18 11:53  06/28/18 11:53  06/28/18 11:53  06/28/18 11:53  06/28/18 11:53














Plan


Activity: advance as tolerated


Weight Bearing Status: Non-Weight Bearing


Diet: low fat, low salt


Additional Instructions: f/u with Dr Perdomo in one week


Follow up with: 


PRIMARY CARE,MD [Primary Care Provider] - 3-5 Days


TOM PERDOMO DO [Staff Physician] - 7 Days


Prescriptions: 


HYDROcodone/APAP 5-325 [Norco 5-325 mg TAB] 1 each PO Q6H PRN #10 tablet


 PRN Reason: Pain, Moderate (4-6)





<TOM PERDOMO - Last Filed: 06/28/18 16:15>





Providers





- Providers


Date of Admission: 


06/26/18 22:23





Attending physician: 


GUANAKO ARORA





 





06/26/18 22:25


Consult to Physician [CONS] Routine 


   Comment: 


   Consulting Provider: TOM PERDOMO


   Physician Instructions: 


   Reason For Exam: VENTRAL HERNIA





06/27/18 09:17


Consult to Interventional Radiology [CONS] Routine 


   Consulting Provider: KADEEM ARORA


   Reason For Exam: drain placement subcutaneous seroma


   Place consult to:: DR. PADRON


   Notified:: DR. PADRON


   Phone number called:: IN HOUSE


   Was contact made?: Yes


   If yes, spoke with:: MD


   Time called:: 08:27











Primary care physician: 


PRIMARY CARE MD








Exam





- Constitutional


Vitals: 


 











Temp Pulse Resp BP Pulse Ox


 


 98.5 F   83   20   113/60   98 


 


 06/28/18 11:53  06/28/18 11:53  06/28/18 11:53  06/28/18 11:53  06/28/18 11:53














Plan


Activity: other (no heavy lifting more than 15 lbs)


Wound: open to air


Special Instructions: other (KARINA drain care - please pay close attention to the 

drain. May shower with the dressing in place. Pat area dry. Empty drain every 

day as shown by nurse and record output.)


Additional Instructions: Call Dr. Perdomo's office with any questions regarding 

KARINA drain